# Patient Record
Sex: FEMALE | HISPANIC OR LATINO | Employment: UNEMPLOYED | URBAN - METROPOLITAN AREA
[De-identification: names, ages, dates, MRNs, and addresses within clinical notes are randomized per-mention and may not be internally consistent; named-entity substitution may affect disease eponyms.]

---

## 2018-04-02 ENCOUNTER — ANESTHESIA - HEALTHEAST (OUTPATIENT)
Dept: OBGYN | Facility: CLINIC | Age: 33
End: 2018-04-02

## 2018-04-04 ENCOUNTER — HOME CARE/HOSPICE - HEALTHEAST (OUTPATIENT)
Dept: HOME HEALTH SERVICES | Facility: HOME HEALTH | Age: 33
End: 2018-04-04

## 2018-04-05 ENCOUNTER — HOME CARE/HOSPICE - HEALTHEAST (OUTPATIENT)
Dept: HOME HEALTH SERVICES | Facility: HOME HEALTH | Age: 33
End: 2018-04-05

## 2021-06-16 PROBLEM — Z34.90 PREGNANT: Status: ACTIVE | Noted: 2018-04-02

## 2021-06-17 NOTE — ANESTHESIA PREPROCEDURE EVALUATION
Anesthesia Evaluation      No history of anesthetic complications     Airway   Mallampati: II  Neck ROM: full   Pulmonary - negative ROS and normal exam                          Cardiovascular - negative ROS and normal exam   Neuro/Psych - negative ROS     Endo/Other - negative ROS      GI/Hepatic/Renal - negative ROS           Dental - normal exam                        Anesthesia Plan  Planned anesthetic: epidural    ASA 2     Anesthetic plan and risks discussed with: patient    Post-op plan: routine recovery

## 2021-06-17 NOTE — ANESTHESIA POSTPROCEDURE EVALUATION
Patient: Sarah Carter  * No procedures listed *  Anesthesia type: No value filed.    Patient location: Labor and Delivery  Last vitals:   Vitals:    04/03/18 0353   BP: 97/50   Pulse: 76   Resp: 16   Temp: 37.2  C (98.9  F)   SpO2: 97%     Post vital signs: stable  Level of consciousness: awake and responds to simple questions  Post-anesthesia pain: pain controlled  Post-anesthesia nausea and vomiting: no  Pulmonary: unassisted, return to baseline  Cardiovascular: stable and blood pressure at baseline  Hydration: adequate  Anesthetic events: no    QCDR Measures:  ASA# 11 - Carley-op Cardiac Arrest: ASA11B - Patient did NOT experience unanticipated cardiac arrest  ASA# 12 - Carley-op Mortality Rate: ASA12B - Patient did NOT die  ASA# 13 - PACU Re-Intubation Rate: NA - No ETT / LMA used for case  ASA# 10 - Composite Anes Safety: ASA10A - No serious adverse event    Additional Notes:  Uncomplicated labor epidural

## 2021-06-17 NOTE — ANESTHESIA PROCEDURE NOTES
Epidural Block    Patient location during procedure: OB  Time Called: 4/2/2018 4:18 PM  Reason for Block:labor epidural  Staffing:  Performing  Anesthesiologist: EDGAR TA  Preanesthetic Checklist  Completed: patient identified, risks, benefits, and alternatives discussed, timeout performed, consent obtained, at patient's request, airway assessed, oxygen available, suction available, emergency drugs available and hand hygiene performed  Procedure  Patient position: sitting  Prep: ChloraPrep  Patient monitoring: continuous pulse oximetry, heart rate and blood pressure  Approach: midline  Location: L2-L3  Injection technique: AHSAN saline  Number of Attempts:1  Needle  Needle type: Maria Esther   Needle gauge: 18 G     Catheter in Space: 5

## 2022-11-13 ENCOUNTER — APPOINTMENT (OUTPATIENT)
Dept: ULTRASOUND IMAGING | Facility: CLINIC | Age: 37
End: 2022-11-13
Attending: STUDENT IN AN ORGANIZED HEALTH CARE EDUCATION/TRAINING PROGRAM
Payer: COMMERCIAL

## 2022-11-13 ENCOUNTER — APPOINTMENT (OUTPATIENT)
Dept: MRI IMAGING | Facility: CLINIC | Age: 37
End: 2022-11-13
Payer: COMMERCIAL

## 2022-11-13 ENCOUNTER — HOSPITAL ENCOUNTER (EMERGENCY)
Facility: CLINIC | Age: 37
Discharge: HOME | End: 2022-11-13
Attending: STUDENT IN AN ORGANIZED HEALTH CARE EDUCATION/TRAINING PROGRAM | Admitting: STUDENT IN AN ORGANIZED HEALTH CARE EDUCATION/TRAINING PROGRAM
Payer: COMMERCIAL

## 2022-11-13 VITALS
RESPIRATION RATE: 16 BRPM | DIASTOLIC BLOOD PRESSURE: 62 MMHG | TEMPERATURE: 97.7 F | OXYGEN SATURATION: 97 % | HEART RATE: 66 BPM | SYSTOLIC BLOOD PRESSURE: 103 MMHG

## 2022-11-13 DIAGNOSIS — R51.9 ACUTE NONINTRACTABLE HEADACHE, UNSPECIFIED HEADACHE TYPE: ICD-10-CM

## 2022-11-13 DIAGNOSIS — R42 VERTIGO: ICD-10-CM

## 2022-11-13 DIAGNOSIS — R11.2 NAUSEA AND VOMITING, UNSPECIFIED VOMITING TYPE: ICD-10-CM

## 2022-11-13 LAB
ALBUMIN SERPL-MCNC: 4.1 G/DL (ref 3.5–5)
ALP SERPL-CCNC: 38 U/L (ref 45–120)
ALT SERPL W P-5'-P-CCNC: 10 U/L (ref 0–45)
ANION GAP SERPL CALCULATED.3IONS-SCNC: 11 MMOL/L (ref 5–18)
AST SERPL W P-5'-P-CCNC: 14 U/L (ref 0–40)
BASOPHILS # BLD AUTO: 0 10E3/UL (ref 0–0.2)
BASOPHILS NFR BLD AUTO: 0 %
BILIRUB SERPL-MCNC: 0.5 MG/DL (ref 0–1)
BUN SERPL-MCNC: 16 MG/DL (ref 8–22)
CALCIUM SERPL-MCNC: 8.9 MG/DL (ref 8.5–10.5)
CHLORIDE BLD-SCNC: 104 MMOL/L (ref 98–107)
CO2 SERPL-SCNC: 24 MMOL/L (ref 22–31)
CREAT SERPL-MCNC: 0.79 MG/DL (ref 0.6–1.1)
EOSINOPHIL # BLD AUTO: 0 10E3/UL (ref 0–0.7)
EOSINOPHIL NFR BLD AUTO: 0 %
ERYTHROCYTE [DISTWIDTH] IN BLOOD BY AUTOMATED COUNT: 13.7 % (ref 10–15)
FLUAV RNA SPEC QL NAA+PROBE: NEGATIVE
FLUBV RNA RESP QL NAA+PROBE: NEGATIVE
GFR SERPL CREATININE-BSD FRML MDRD: >90 ML/MIN/1.73M2
GLUCOSE BLD-MCNC: 101 MG/DL (ref 70–125)
HCT VFR BLD AUTO: 37.3 % (ref 35–47)
HGB BLD-MCNC: 12.2 G/DL (ref 11.7–15.7)
IMM GRANULOCYTES # BLD: 0 10E3/UL
IMM GRANULOCYTES NFR BLD: 0 %
LIPASE SERPL-CCNC: 21 U/L (ref 0–52)
LYMPHOCYTES # BLD AUTO: 1.2 10E3/UL (ref 0.8–5.3)
LYMPHOCYTES NFR BLD AUTO: 14 %
MCH RBC QN AUTO: 29.1 PG (ref 26.5–33)
MCHC RBC AUTO-ENTMCNC: 32.7 G/DL (ref 31.5–36.5)
MCV RBC AUTO: 89 FL (ref 78–100)
MONOCYTES # BLD AUTO: 0.5 10E3/UL (ref 0–1.3)
MONOCYTES NFR BLD AUTO: 6 %
NEUTROPHILS # BLD AUTO: 7.1 10E3/UL (ref 1.6–8.3)
NEUTROPHILS NFR BLD AUTO: 80 %
NRBC # BLD AUTO: 0 10E3/UL
NRBC BLD AUTO-RTO: 0 /100
PLATELET # BLD AUTO: 195 10E3/UL (ref 150–450)
POTASSIUM BLD-SCNC: 3.6 MMOL/L (ref 3.5–5)
PROT SERPL-MCNC: 7 G/DL (ref 6–8)
RBC # BLD AUTO: 4.19 10E6/UL (ref 3.8–5.2)
RSV RNA SPEC NAA+PROBE: NEGATIVE
SARS-COV-2 RNA RESP QL NAA+PROBE: NEGATIVE
SODIUM SERPL-SCNC: 139 MMOL/L (ref 136–145)
WBC # BLD AUTO: 8.8 10E3/UL (ref 4–11)

## 2022-11-13 PROCEDURE — 36415 COLL VENOUS BLD VENIPUNCTURE: CPT | Performed by: STUDENT IN AN ORGANIZED HEALTH CARE EDUCATION/TRAINING PROGRAM

## 2022-11-13 PROCEDURE — 96374 THER/PROPH/DIAG INJ IV PUSH: CPT

## 2022-11-13 PROCEDURE — 99285 EMERGENCY DEPT VISIT HI MDM: CPT | Mod: CS,25

## 2022-11-13 PROCEDURE — 250N000011 HC RX IP 250 OP 636

## 2022-11-13 PROCEDURE — 80053 COMPREHEN METABOLIC PANEL: CPT | Performed by: STUDENT IN AN ORGANIZED HEALTH CARE EDUCATION/TRAINING PROGRAM

## 2022-11-13 PROCEDURE — 76705 ECHO EXAM OF ABDOMEN: CPT

## 2022-11-13 PROCEDURE — 250N000013 HC RX MED GY IP 250 OP 250 PS 637: Performed by: STUDENT IN AN ORGANIZED HEALTH CARE EDUCATION/TRAINING PROGRAM

## 2022-11-13 PROCEDURE — 70551 MRI BRAIN STEM W/O DYE: CPT

## 2022-11-13 PROCEDURE — C9803 HOPD COVID-19 SPEC COLLECT: HCPCS

## 2022-11-13 PROCEDURE — 258N000003 HC RX IP 258 OP 636

## 2022-11-13 PROCEDURE — 96361 HYDRATE IV INFUSION ADD-ON: CPT

## 2022-11-13 PROCEDURE — 96375 TX/PRO/DX INJ NEW DRUG ADDON: CPT

## 2022-11-13 PROCEDURE — 85004 AUTOMATED DIFF WBC COUNT: CPT | Performed by: STUDENT IN AN ORGANIZED HEALTH CARE EDUCATION/TRAINING PROGRAM

## 2022-11-13 PROCEDURE — 83690 ASSAY OF LIPASE: CPT | Performed by: STUDENT IN AN ORGANIZED HEALTH CARE EDUCATION/TRAINING PROGRAM

## 2022-11-13 PROCEDURE — 87637 SARSCOV2&INF A&B&RSV AMP PRB: CPT

## 2022-11-13 RX ORDER — DIPHENHYDRAMINE HYDROCHLORIDE 50 MG/ML
50 INJECTION INTRAMUSCULAR; INTRAVENOUS ONCE
Status: COMPLETED | OUTPATIENT
Start: 2022-11-13 | End: 2022-11-13

## 2022-11-13 RX ORDER — MECLIZINE HYDROCHLORIDE 25 MG/1
25 TABLET ORAL 3 TIMES DAILY PRN
Qty: 15 TABLET | Refills: 0 | Status: SHIPPED | OUTPATIENT
Start: 2022-11-13

## 2022-11-13 RX ORDER — KETOROLAC TROMETHAMINE 15 MG/ML
15 INJECTION, SOLUTION INTRAMUSCULAR; INTRAVENOUS ONCE
Status: COMPLETED | OUTPATIENT
Start: 2022-11-13 | End: 2022-11-13

## 2022-11-13 RX ORDER — LORAZEPAM 1 MG/1
1 TABLET ORAL ONCE
Status: COMPLETED | OUTPATIENT
Start: 2022-11-13 | End: 2022-11-13

## 2022-11-13 RX ORDER — METOCLOPRAMIDE HYDROCHLORIDE 5 MG/ML
10 INJECTION INTRAMUSCULAR; INTRAVENOUS ONCE
Status: COMPLETED | OUTPATIENT
Start: 2022-11-13 | End: 2022-11-13

## 2022-11-13 RX ADMIN — SODIUM CHLORIDE 1000 ML: 9 INJECTION, SOLUTION INTRAVENOUS at 17:00

## 2022-11-13 RX ADMIN — DIPHENHYDRAMINE HYDROCHLORIDE 50 MG: 50 INJECTION INTRAMUSCULAR; INTRAVENOUS at 17:01

## 2022-11-13 RX ADMIN — METOCLOPRAMIDE HYDROCHLORIDE 10 MG: 5 INJECTION INTRAMUSCULAR; INTRAVENOUS at 17:01

## 2022-11-13 RX ADMIN — LORAZEPAM 1 MG: 1 TABLET ORAL at 17:23

## 2022-11-13 RX ADMIN — KETOROLAC TROMETHAMINE 15 MG: 15 INJECTION, SOLUTION INTRAMUSCULAR; INTRAVENOUS at 17:00

## 2022-11-13 ASSESSMENT — ENCOUNTER SYMPTOMS
DIZZINESS: 1
NECK STIFFNESS: 0
EYE PAIN: 0
DIARRHEA: 0
LIGHT-HEADEDNESS: 0
SORE THROAT: 0
VOMITING: 1
NECK PAIN: 0
WEAKNESS: 0
SHORTNESS OF BREATH: 0
ABDOMINAL PAIN: 1
PHOTOPHOBIA: 0
CHILLS: 1
NUMBNESS: 0
HEADACHES: 1
FEVER: 1
RHINORRHEA: 0
DYSURIA: 0
HEMATURIA: 0
BLOOD IN STOOL: 0
NAUSEA: 1
BACK PAIN: 0
SPEECH DIFFICULTY: 0

## 2022-11-13 ASSESSMENT — ACTIVITIES OF DAILY LIVING (ADL)
ADLS_ACUITY_SCORE: 33
ADLS_ACUITY_SCORE: 35

## 2022-11-13 NOTE — ED TRIAGE NOTES
Pt arrives to ED with c/o upper abdominal pain, nausea, vomiting, and headache that started about an hour prior to arrival. No diarrhea.      Triage Assessment     Row Name 11/13/22 9284       Triage Assessment (Adult)    Airway WDL WDL       Respiratory WDL    Respiratory WDL WDL       Skin Circulation/Temperature WDL    Skin Circulation/Temperature WDL WDL       Cardiac WDL    Cardiac WDL WDL       Peripheral/Neurovascular WDL    Peripheral Neurovascular WDL WDL       Cognitive/Neuro/Behavioral WDL    Cognitive/Neuro/Behavioral WDL WDL

## 2022-11-13 NOTE — ED PROVIDER NOTES
EMERGENCY DEPARTMENT ENCOUNTER      NAME: Sarah Carter  AGE: 36 year old female  YOB: 1985  MRN: [id number]  EVALUATION DATE & TIME: 2022  4:45 PM    PCP: No Ref-Primary, Physician    ED PROVIDER: Mary Ramon PA-C      Chief Complaint   Patient presents with     Abdominal Pain     Headache         FINAL IMPRESSION:  1. Vertigo    2. Acute nonintractable headache, unspecified headache type    3. Nausea and vomiting, unspecified vomiting type          ED COURSE & MEDICAL DECISION MAKIN:03 PM Met with patient for initial interview. Plan for care discussed.  4:25 PM Staffed patient with Dr. Guerin.  7:39 PM Reevaluated and updated patient. Patient feeling improved. I discussed the plan for discharge with the patient, and patient is agreeable. We discussed supportive cares at home and reasons for return to the ER including new or worsening symptoms. All questions and concerns addressed. Patient to be discharged by RN.    Pertinent Labs & Imaging studies reviewed. (See chart for details)  36 year old otherwise healthy female presents to the Emergency Department for evaluation of continuous vertigo, nausea, vomiting, and headache. Upon exam, patient is afebrile, hemodynamically stable, but appears uncomfortable. Benign neuro exam; however given continuous nature of vertigo concern for central cause. Differential diagnosis includes but not limited to BPPV, CVA, acoustic neuroma, gastritis, pancreatitis, biliary disease, viral syndrome, electrolyte abnormality, anemia. Based on patient's presenting symptoms, laboratories and imaging were ordered.     Vitals and work-up reassuring. CBC and CMP unremarkable. Lipase negative. Abdominal US negative. Negative influenza, COVID, RSV. MRI negative. Patient was treated with Migraine Cocktail and Ativan with improvement in symptoms. Symptoms and workup more consistent with vertigo. Patient was made aware of the above findings. Plan to discharge  patient home with symptomatic care including prescription for meclizine. The patient was stable and well appearing throughout entire ER visit and upon discharge. The patient was advised to follow up with her PCP if symptoms persist and return to the ER if any new or worsening symptoms develop. The patient verbalizes understanding and agrees with the plan. At the conclusion of the encounter I discussed the results of all of the tests and the disposition. The questions were answered. The patient or family acknowledged understanding and was agreeable with the care plan.     ED Course as of 11/13/22 2323   [place] Nov 13, 2022 1943 Patient is a healthy 36-year-old here with sudden onset dizziness that she describes as vertigo, difficulty walking, upper abdominal discomfort, nausea and vomiting.  On arrival she looks uncomfortable.  Vitals otherwise stable.  Neurologically grossly intact.  TMs clear.  Abdomen is generally tender in the upper quadrants.  Labs drawn as well as ultrasound which all are reassuring.  She was given migraine cocktail here with significant improvement.  MRI brain was done because of her new onset vertigo difficulty walking which is unremarkable.  Repeat evaluation showing her resting comfortably with no focal deficits and stating that she feels a lot better.  Plan for discharge home with meclizine as needed and outpatient follow-up.         MEDICATIONS GIVEN IN THE EMERGENCY:  Medications   ketorolac (TORADOL) injection 15 mg (15 mg Intravenous Given 11/13/22 1700)   0.9% sodium chloride BOLUS (0 mLs Intravenous Stopped 11/13/22 2020)   metoclopramide (REGLAN) injection 10 mg (10 mg Intravenous Given 11/13/22 1701)   diphenhydrAMINE (BENADRYL) injection 50 mg (50 mg Intravenous Given 11/13/22 1701)   LORazepam (ATIVAN) tablet 1 mg (1 mg Oral Given 11/13/22 1723)       NEW PRESCRIPTIONS STARTED AT TODAY'S ER VISIT  Discharge Medication List as of 11/13/2022  8:20 PM      START taking these  medications    Details   meclizine (ANTIVERT) 25 MG tablet Take 1 tablet (25 mg) by mouth 3 times daily as needed for dizziness, Disp-15 tablet, R-0, Local Print                =================================================================    HPI    Patient information was obtained from: patient and patient's partner    Use of : Yes (Phone) - Language Chinese    Sarah Carter is a 36 year old otherwise healthy female who presents to this ED for evaluation of headache, vertigo, nausea, vomiting, and abdominal pain. 4 hours prior to arrival, patient developed abrupt onset vertigo with associated nausea, vomiting, and upper abdominal pain. She reports an associated bilateral frontal headache and full body tingling. She denies associated hearing loss, tinnitus, recent URI-like symptoms, changes in vision, jaw pain, neck pain or stiffness, speech difficulty, upper or lower extremity weakness, chest pain, shortness of breath, diarrhea, blood in stools, or urinary symptoms. No known exposure to COVID or influenza. No recent travel. No contacts are similarly ill.     REVIEW OF SYSTEMS   Review of Systems   Constitutional: Positive for chills and fever.   HENT: Negative for congestion, ear pain, hearing loss, rhinorrhea, sore throat and tinnitus.    Eyes: Negative for photophobia, pain and visual disturbance.   Respiratory: Negative for shortness of breath.    Cardiovascular: Negative for chest pain and leg swelling.   Gastrointestinal: Positive for abdominal pain, nausea and vomiting. Negative for blood in stool and diarrhea.   Genitourinary: Negative for dysuria, hematuria, vaginal bleeding and vaginal discharge.   Musculoskeletal: Negative for back pain, neck pain and neck stiffness.   Skin: Negative for rash.   Neurological: Positive for dizziness (continuous) and headaches. Negative for syncope, speech difficulty, weakness, light-headedness and numbness.   Psychiatric/Behavioral: Negative for  suicidal ideas.       PAST MEDICAL HISTORY:  No past medical history on file.    PAST SURGICAL HISTORY:  No past surgical history on file.        CURRENT MEDICATIONS:    meclizine (ANTIVERT) 25 MG tablet  calcium carbonate-vitamin D2 500 mg(1,250mg) -200 unit tablet  naproxen (NAPROSYN) 500 MG tablet  prenatal no115-iron-folic acid 29 mg iron- 1 mg Chew        ALLERGIES:  Allergies   Allergen Reactions     Penicillins Unknown     Other Drug Allergy (See Comments) Itching     [place]-pt reports med causes red/itchy eyes       FAMILY HISTORY:  No family history on file.    SOCIAL HISTORY:   Social History     Socioeconomic History     Marital status:    Tobacco Use     Smoking status: Never     Smokeless tobacco: Never   Substance and Sexual Activity     Alcohol use: No     Drug use: No     Sexual activity: Yes       VITALS:  /62   Pulse 66   Temp 97.7  F (36.5  C) (Temporal)   Resp 16   LMP 11/13/2022 (Exact Date)   SpO2 97%     PHYSICAL EXAM    Constitutional:  Alert, appears uncomfortable. Cooperative.  EYES: PERRL. EOM intact. Conjunctivae clear.   HENT:  Atraumatic, normocephalic. External and internal ears normal with normal TM without erythema or perforation. Normal nose. Oropharynx without erythema or swelling. Moist membranes.  Respiratory:  Respirations even, unlabored, in no acute respiratory distress. Lungs clear to auscultation bilaterally without wheeze, rhonchi, or rales. No cough.  Cardiovascular:  Regular rate and rhythm, good peripheral perfusion.  GI: Soft, flat, nondistended, very mild epigastric tenderness to palpation. Bowel sounds normal.  Musculoskeletal:  No edema. No cyanosis. Range of motion major extremities intact.    Integument: Warm, Dry, No erythema, No rash.   Neurologic:  Alert & oriented. No focal deficits noted. Motor intact. Sensation intact. Coordination intact. 5/5 strength. GCS 15.   Psych: Normal mood and affect.      LAB:  All pertinent labs reviewed and  interpreted.  Results for orders placed or performed during the hospital encounter of 11/13/22   Abdomen US, limited (RUQ only)    Impression    IMPRESSION:  1.  Normal limited abdominal ultrasound.       MR Brain w/o Contrast    Impression    IMPRESSION:  No acute intracranial finding.   Comprehensive metabolic panel   Result Value Ref Range    Sodium 139 136 - 145 mmol/L    Potassium 3.6 3.5 - 5.0 mmol/L    Chloride 104 98 - 107 mmol/L    Carbon Dioxide (CO2) 24 22 - 31 mmol/L    Anion Gap 11 5 - 18 mmol/L    Urea Nitrogen 16 8 - 22 mg/dL    Creatinine 0.79 0.60 - 1.10 mg/dL    Calcium 8.9 8.5 - 10.5 mg/dL    Glucose 101 70 - 125 mg/dL    Alkaline Phosphatase 38 (L) 45 - 120 U/L    AST 14 0 - 40 U/L    ALT 10 0 - 45 U/L    Protein Total 7.0 6.0 - 8.0 g/dL    Albumin 4.1 3.5 - 5.0 g/dL    Bilirubin Total 0.5 0.0 - 1.0 mg/dL    GFR Estimate >90 >60 mL/min/1.73m2   Result Value Ref Range    Lipase 21 0 - 52 U/L   CBC with platelets and differential   Result Value Ref Range    WBC Count 8.8 4.0 - 11.0 10e3/uL    RBC Count 4.19 3.80 - 5.20 10e6/uL    Hemoglobin 12.2 11.7 - 15.7 g/dL    Hematocrit 37.3 35.0 - 47.0 %    MCV 89 78 - 100 fL    MCH 29.1 26.5 - 33.0 pg    MCHC 32.7 31.5 - 36.5 g/dL    RDW 13.7 10.0 - 15.0 %    Platelet Count 195 150 - 450 10e3/uL    % Neutrophils 80 %    % Lymphocytes 14 %    % Monocytes 6 %    % Eosinophils 0 %    % Basophils 0 %    % Immature Granulocytes 0 %    NRBCs per 100 WBC 0 <1 /100    Absolute Neutrophils 7.1 1.6 - 8.3 10e3/uL    Absolute Lymphocytes 1.2 0.8 - 5.3 10e3/uL    Absolute Monocytes 0.5 0.0 - 1.3 10e3/uL    Absolute Eosinophils 0.0 0.0 - 0.7 10e3/uL    Absolute Basophils 0.0 0.0 - 0.2 10e3/uL    Absolute Immature Granulocytes 0.0 <=0.4 10e3/uL    Absolute NRBCs 0.0 10e3/uL   Symptomatic; Yes; 11/13/2022 Influenza A/B & SARS-CoV2 (COVID-19) Virus PCR Multiplex Nasopharyngeal    Specimen: Nasopharyngeal; Swab   Result Value Ref Range    Influenza A PCR Negative Negative     Influenza B PCR Negative Negative    RSV PCR Negative Negative    SARS CoV2 PCR Negative Negative       RADIOLOGY:  Reviewed all pertinent imaging. Please see official radiology report.  MR Brain w/o Contrast   Final Result   IMPRESSION:   No acute intracranial finding.      Abdomen US, limited (RUQ only)   Final Result   IMPRESSION:   1.  Normal limited abdominal ultrasound.                Mary Ramon PA-C  [facility] EMERGENCY ROOM  [facility] [facility] [address] [phone]     Mary Ramon PA-C  11/13/22 6623

## 2022-11-13 NOTE — ED PROVIDER NOTES
"Emergency Department Midlevel Supervisory Note     I personally saw the patient and performed a substantive portion of the visit including all aspects of the medical decision making.    ED Course:  4:09 PM Mary Ramon PA-C staffed patient with me. I agree with their assessment and plan of management, and I will see the patient.  4:09 PM I met with the patient to introduce myself, gather additional history, perform my initial exam, and discuss the plan.   ED Course as of 11/13/22 1948   Sun Nov 13, 2022 1943 Patient is a healthy 36-year-old here with sudden onset dizziness that she describes as vertigo, difficulty walking, upper abdominal discomfort, nausea and vomiting.  On arrival she looks uncomfortable.  Vitals otherwise stable.  Neurologically grossly intact.  TMs clear.  Abdomen is generally tender in the upper quadrants.  Labs drawn as well as ultrasound which all are reassuring.  She was given migraine cocktail here with significant improvement.  MRI brain was done because of her new onset vertigo difficulty walking which is unremarkable.  Repeat evaluation showing her resting comfortably with no focal deficits and stating that she feels a lot better.  Plan for discharge home with meclizine as needed and outpatient follow-up.         Brief HPI:     Sarah Carter is a 36 year old female who presents for evaluation of vertigo. She has had constant episodes of \"room spinning\" vertigo along with upper and lower extremities tingling. She notes being constantly nauseous and had one episode of emesis. Patient endorses frontal headache. She reports upper abdominal pain with palpation, possibly resulting from her vertigo.    I, Gina Horner, am serving as a scribe to document services personally performed by Ben Guerin DO, based on my observations and the provider's statements to me.   I, Ben Guerin DO attest that Gina Horner was acting in a scribe capacity, has observed my performance of the services " and has documented them in accordance with my direction.    Brief Physical Exam: /57   Pulse 77   Temp 97.7  F (36.5  C) (Temporal)   Resp 16   LMP 11/13/2022 (Exact Date)   SpO2 99%   Constitutional:  Alert, in no acute distress  EYES: Conjunctivae clear  HENT:  Atraumatic, normocephalic  Respiratory:  Respirations even, unlabored, in no acute respiratory distress  Cardiovascular:  Regular rate and rhythm, good peripheral perfusion  GI: Soft, nondistended, nontender, no palpable masses, no rebound, no guarding   Musculoskeletal:  No edema. No cyanosis. Range of motion major extremities intact.    Integument: Warm, Dry, No erythema, No rash.   Neurologic:  Alert & oriented, no focal deficits noted  Psych: Normal mood and affect     MDM:    ED Course as of 11/13/22 1948   Sun Nov 13, 2022 1943 Patient is a healthy 36-year-old here with sudden onset dizziness that she describes as vertigo, difficulty walking, upper abdominal discomfort, nausea and vomiting.  On arrival she looks uncomfortable.  Vitals otherwise stable.  Neurologically grossly intact.  TMs clear.  Abdomen is generally tender in the upper quadrants.  Labs drawn as well as ultrasound which all are reassuring.  She was given migraine cocktail here with significant improvement.  MRI brain was done because of her new onset vertigo difficulty walking which is unremarkable.  Repeat evaluation showing her resting comfortably with no focal deficits and stating that she feels a lot better.  Plan for discharge home with meclizine as needed and outpatient follow-up.     -Patient's imaging negative.  She looks well clinically.  Plan for discharge home    1. Vertigo    2. Acute nonintractable headache, unspecified headache type    3. Nausea and vomiting, unspecified vomiting type        Labs and Imaging:  Results for orders placed or performed during the hospital encounter of 11/13/22   Abdomen US, limited (RUQ only)    Impression    IMPRESSION:  1.   Normal limited abdominal ultrasound.       MR Brain w/o Contrast    Impression    IMPRESSION:  No acute intracranial finding.   Comprehensive metabolic panel   Result Value Ref Range    Sodium 139 136 - 145 mmol/L    Potassium 3.6 3.5 - 5.0 mmol/L    Chloride 104 98 - 107 mmol/L    Carbon Dioxide (CO2) 24 22 - 31 mmol/L    Anion Gap 11 5 - 18 mmol/L    Urea Nitrogen 16 8 - 22 mg/dL    Creatinine 0.79 0.60 - 1.10 mg/dL    Calcium 8.9 8.5 - 10.5 mg/dL    Glucose 101 70 - 125 mg/dL    Alkaline Phosphatase 38 (L) 45 - 120 U/L    AST 14 0 - 40 U/L    ALT 10 0 - 45 U/L    Protein Total 7.0 6.0 - 8.0 g/dL    Albumin 4.1 3.5 - 5.0 g/dL    Bilirubin Total 0.5 0.0 - 1.0 mg/dL    GFR Estimate >90 >60 mL/min/1.73m2   Result Value Ref Range    Lipase 21 0 - 52 U/L   CBC with platelets and differential   Result Value Ref Range    WBC Count 8.8 4.0 - 11.0 10e3/uL    RBC Count 4.19 3.80 - 5.20 10e6/uL    Hemoglobin 12.2 11.7 - 15.7 g/dL    Hematocrit 37.3 35.0 - 47.0 %    MCV 89 78 - 100 fL    MCH 29.1 26.5 - 33.0 pg    MCHC 32.7 31.5 - 36.5 g/dL    RDW 13.7 10.0 - 15.0 %    Platelet Count 195 150 - 450 10e3/uL    % Neutrophils 80 %    % Lymphocytes 14 %    % Monocytes 6 %    % Eosinophils 0 %    % Basophils 0 %    % Immature Granulocytes 0 %    NRBCs per 100 WBC 0 <1 /100    Absolute Neutrophils 7.1 1.6 - 8.3 10e3/uL    Absolute Lymphocytes 1.2 0.8 - 5.3 10e3/uL    Absolute Monocytes 0.5 0.0 - 1.3 10e3/uL    Absolute Eosinophils 0.0 0.0 - 0.7 10e3/uL    Absolute Basophils 0.0 0.0 - 0.2 10e3/uL    Absolute Immature Granulocytes 0.0 <=0.4 10e3/uL    Absolute NRBCs 0.0 10e3/uL   Symptomatic; Yes; 11/13/2022 Influenza A/B & SARS-CoV2 (COVID-19) Virus PCR Multiplex Nasopharyngeal    Specimen: Nasopharyngeal; Swab   Result Value Ref Range    Influenza A PCR Negative Negative    Influenza B PCR Negative Negative    RSV PCR Negative Negative    SARS CoV2 PCR Negative Negative     I have reviewed the relevant laboratory and radiology  studies    Procedures:  I was present for the key portions of this procedure:   Ben Guerin DO  [facility] EMERGENCY ROOM  [address] [facility] [address] [phone]     Ben Guerin DO  11/13/22 1958

## 2022-11-14 NOTE — DISCHARGE INSTRUCTIONS
You were seen in the ER for symptoms of vertigo.  Your work-up was negative including labs and MRI.. Your symptoms are likely secondary to benign paroxysmal positional vertigo (BPPV) as discussed.    You were prescribed meclizine to be taken as needed for symptoms of vertigo or nausea. Please stay well hydrated.     Please establish care with a primary care provider to discuss long-term management of vertigo if symptoms persist.    Please return to the ER if any new or worsening symptoms develop including severe headache, changes in vision, speech difficulty, chest pain, shortness of breath, abdominal pain, vomiting, weakness/numbness/tingling in the arms or legs, fever/chills.

## 2025-01-23 ENCOUNTER — APPOINTMENT (OUTPATIENT)
Dept: CT IMAGING | Facility: CLINIC | Age: 40
End: 2025-01-23
Attending: STUDENT IN AN ORGANIZED HEALTH CARE EDUCATION/TRAINING PROGRAM
Payer: COMMERCIAL

## 2025-01-23 ENCOUNTER — HOSPITAL ENCOUNTER (EMERGENCY)
Facility: CLINIC | Age: 40
Discharge: HOME | End: 2025-01-23
Attending: STUDENT IN AN ORGANIZED HEALTH CARE EDUCATION/TRAINING PROGRAM
Payer: COMMERCIAL

## 2025-01-23 VITALS
HEART RATE: 73 BPM | BODY MASS INDEX: 23.07 KG/M2 | HEIGHT: 67 IN | WEIGHT: 147 LBS | RESPIRATION RATE: 19 BRPM | TEMPERATURE: 98.3 F | DIASTOLIC BLOOD PRESSURE: 58 MMHG | OXYGEN SATURATION: 99 % | SYSTOLIC BLOOD PRESSURE: 123 MMHG

## 2025-01-23 DIAGNOSIS — R10.31 RIGHT LOWER QUADRANT ABDOMINAL PAIN: ICD-10-CM

## 2025-01-23 LAB
ALBUMIN SERPL BCG-MCNC: 4.5 G/DL (ref 3.5–5.2)
ALBUMIN UR-MCNC: NEGATIVE MG/DL
ALP SERPL-CCNC: 43 U/L (ref 40–150)
ALT SERPL W P-5'-P-CCNC: 13 U/L (ref 0–50)
ANION GAP SERPL CALCULATED.3IONS-SCNC: 12 MMOL/L (ref 7–15)
APPEARANCE UR: CLEAR
AST SERPL W P-5'-P-CCNC: 22 U/L (ref 0–45)
BASOPHILS # BLD AUTO: 0 10E3/UL (ref 0–0.2)
BASOPHILS NFR BLD AUTO: 0 %
BILIRUB DIRECT SERPL-MCNC: <0.2 MG/DL (ref 0–0.3)
BILIRUB SERPL-MCNC: 0.2 MG/DL
BILIRUB UR QL STRIP: NEGATIVE
BUN SERPL-MCNC: 22.6 MG/DL (ref 6–20)
CALCIUM SERPL-MCNC: 9.8 MG/DL (ref 8.8–10.4)
CHLORIDE SERPL-SCNC: 102 MMOL/L (ref 98–107)
COLOR UR AUTO: ABNORMAL
CREAT SERPL-MCNC: 0.99 MG/DL (ref 0.51–0.95)
EGFRCR SERPLBLD CKD-EPI 2021: 75 ML/MIN/1.73M2
EOSINOPHIL # BLD AUTO: 0.1 10E3/UL (ref 0–0.7)
EOSINOPHIL NFR BLD AUTO: 1 %
ERYTHROCYTE [DISTWIDTH] IN BLOOD BY AUTOMATED COUNT: 13.1 % (ref 10–15)
GLUCOSE SERPL-MCNC: 84 MG/DL (ref 70–99)
GLUCOSE UR STRIP-MCNC: NEGATIVE MG/DL
HCG SERPL QL: NEGATIVE
HCG UR QL: NEGATIVE
HCO3 SERPL-SCNC: 26 MMOL/L (ref 22–29)
HCT VFR BLD AUTO: 39.4 % (ref 35–47)
HGB BLD-MCNC: 13.4 G/DL (ref 11.7–15.7)
HGB UR QL STRIP: ABNORMAL
HOLD SPECIMEN: NORMAL
IMM GRANULOCYTES # BLD: 0 10E3/UL
IMM GRANULOCYTES NFR BLD: 0 %
KETONES UR STRIP-MCNC: NEGATIVE MG/DL
LEUKOCYTE ESTERASE UR QL STRIP: NEGATIVE
LYMPHOCYTES # BLD AUTO: 1.8 10E3/UL (ref 0.8–5.3)
LYMPHOCYTES NFR BLD AUTO: 25 %
MCH RBC QN AUTO: 30.3 PG (ref 26.5–33)
MCHC RBC AUTO-ENTMCNC: 34 G/DL (ref 31.5–36.5)
MCV RBC AUTO: 89 FL (ref 78–100)
MONOCYTES # BLD AUTO: 0.6 10E3/UL (ref 0–1.3)
MONOCYTES NFR BLD AUTO: 9 %
MUCOUS THREADS #/AREA URNS LPF: PRESENT /LPF
NEUTROPHILS # BLD AUTO: 4.5 10E3/UL (ref 1.6–8.3)
NEUTROPHILS NFR BLD AUTO: 65 %
NITRATE UR QL: NEGATIVE
NRBC # BLD AUTO: 0 10E3/UL
NRBC BLD AUTO-RTO: 0 /100
PH UR STRIP: 6.5 [PH] (ref 5–7)
PLATELET # BLD AUTO: 238 10E3/UL (ref 150–450)
POTASSIUM SERPL-SCNC: 3.9 MMOL/L (ref 3.4–5.3)
PROT SERPL-MCNC: 7.4 G/DL (ref 6.4–8.3)
RBC # BLD AUTO: 4.42 10E6/UL (ref 3.8–5.2)
RBC URINE: <1 /HPF
SODIUM SERPL-SCNC: 140 MMOL/L (ref 135–145)
SP GR UR STRIP: 1.03 (ref 1–1.03)
SQUAMOUS EPITHELIAL: 1 /HPF
UROBILINOGEN UR STRIP-MCNC: <2 MG/DL
WBC # BLD AUTO: 7 10E3/UL (ref 4–11)
WBC URINE: <1 /HPF

## 2025-01-23 PROCEDURE — 99285 EMERGENCY DEPT VISIT HI MDM: CPT | Mod: 25

## 2025-01-23 PROCEDURE — 82565 ASSAY OF CREATININE: CPT | Performed by: STUDENT IN AN ORGANIZED HEALTH CARE EDUCATION/TRAINING PROGRAM

## 2025-01-23 PROCEDURE — 84155 ASSAY OF PROTEIN SERUM: CPT | Performed by: STUDENT IN AN ORGANIZED HEALTH CARE EDUCATION/TRAINING PROGRAM

## 2025-01-23 PROCEDURE — 250N000011 HC RX IP 250 OP 636: Performed by: STUDENT IN AN ORGANIZED HEALTH CARE EDUCATION/TRAINING PROGRAM

## 2025-01-23 PROCEDURE — 84703 CHORIONIC GONADOTROPIN ASSAY: CPT | Performed by: STUDENT IN AN ORGANIZED HEALTH CARE EDUCATION/TRAINING PROGRAM

## 2025-01-23 PROCEDURE — 81025 URINE PREGNANCY TEST: CPT | Performed by: STUDENT IN AN ORGANIZED HEALTH CARE EDUCATION/TRAINING PROGRAM

## 2025-01-23 PROCEDURE — 85004 AUTOMATED DIFF WBC COUNT: CPT | Performed by: STUDENT IN AN ORGANIZED HEALTH CARE EDUCATION/TRAINING PROGRAM

## 2025-01-23 PROCEDURE — 81001 URINALYSIS AUTO W/SCOPE: CPT | Performed by: STUDENT IN AN ORGANIZED HEALTH CARE EDUCATION/TRAINING PROGRAM

## 2025-01-23 PROCEDURE — 85014 HEMATOCRIT: CPT | Performed by: STUDENT IN AN ORGANIZED HEALTH CARE EDUCATION/TRAINING PROGRAM

## 2025-01-23 PROCEDURE — 74177 CT ABD & PELVIS W/CONTRAST: CPT

## 2025-01-23 PROCEDURE — 80048 BASIC METABOLIC PNL TOTAL CA: CPT | Performed by: STUDENT IN AN ORGANIZED HEALTH CARE EDUCATION/TRAINING PROGRAM

## 2025-01-23 PROCEDURE — 96374 THER/PROPH/DIAG INJ IV PUSH: CPT | Mod: 59

## 2025-01-23 PROCEDURE — 82310 ASSAY OF CALCIUM: CPT | Performed by: STUDENT IN AN ORGANIZED HEALTH CARE EDUCATION/TRAINING PROGRAM

## 2025-01-23 PROCEDURE — 82040 ASSAY OF SERUM ALBUMIN: CPT | Performed by: STUDENT IN AN ORGANIZED HEALTH CARE EDUCATION/TRAINING PROGRAM

## 2025-01-23 PROCEDURE — 36415 COLL VENOUS BLD VENIPUNCTURE: CPT | Performed by: STUDENT IN AN ORGANIZED HEALTH CARE EDUCATION/TRAINING PROGRAM

## 2025-01-23 RX ORDER — IOPAMIDOL 755 MG/ML
90 INJECTION, SOLUTION INTRAVASCULAR ONCE
Status: COMPLETED | OUTPATIENT
Start: 2025-01-23 | End: 2025-01-23

## 2025-01-23 RX ORDER — KETOROLAC TROMETHAMINE 15 MG/ML
15 INJECTION, SOLUTION INTRAMUSCULAR; INTRAVENOUS ONCE
Status: COMPLETED | OUTPATIENT
Start: 2025-01-23 | End: 2025-01-23

## 2025-01-23 RX ADMIN — KETOROLAC TROMETHAMINE 15 MG: 15 INJECTION, SOLUTION INTRAMUSCULAR; INTRAVENOUS at 21:47

## 2025-01-23 RX ADMIN — IOPAMIDOL 90 ML: 755 INJECTION, SOLUTION INTRAVENOUS at 22:09

## 2025-01-23 ASSESSMENT — COLUMBIA-SUICIDE SEVERITY RATING SCALE - C-SSRS
2. HAVE YOU ACTUALLY HAD ANY THOUGHTS OF KILLING YOURSELF IN THE PAST MONTH?: NO
1. IN THE PAST MONTH, HAVE YOU WISHED YOU WERE DEAD OR WISHED YOU COULD GO TO SLEEP AND NOT WAKE UP?: NO
6. HAVE YOU EVER DONE ANYTHING, STARTED TO DO ANYTHING, OR PREPARED TO DO ANYTHING TO END YOUR LIFE?: NO

## 2025-01-23 ASSESSMENT — ACTIVITIES OF DAILY LIVING (ADL): ADLS_ACUITY_SCORE: 41

## 2025-01-24 NOTE — ED PROVIDER NOTES
EMERGENCY DEPARTMENT ENCOUNTER      NAME: Sarah Carter  AGE: 39 year old female  YOB: 1985  MRN: [id number]  EVALUATION DATE & TIME: No admission date for patient encounter.    PCP: No Ref-Primary, Physician    ED PROVIDER: Lyle Lopes MD      Chief Complaint   Patient presents with    Back Pain    Abdominal Pain         FINAL IMPRESSION:  1. Right lower quadrant abdominal pain          ED COURSE & MEDICAL DECISION MAKING:    Pertinent Labs & Imaging studies reviewed. (See chart for details)  39 year old female presents to the Emergency Department for evaluation of abdominal and back pain    Patient is a 39-year-old female present to the emergency department for right lower back and abdominal pain present since yesterday.  Pains been essentially constant.  No clear exacerbating or relieving factors.  Has not had any vomiting.  No urinary symptoms.  No abnormal vaginal bleeding or discharge.  Denies any fevers at home.  Denies any prior abdominal surgeries    On exam patient does have some right lower quadrant abdominal tenderness as well as pain in the right flank.  No guarding or rebound.  Warm and well-perfused extremities.  Lungs clear without any wheezes or rales    Initial differential includes was not limited to appendicitis, kidney stone, cholecystitis or perhaps ovarian torsion or ectopic pregnancy.  Will obtain urinalysis and urine pregnancy as well as a CT abdomen and pelvis along with blood work    ED Course as of 01/23/25 2331   Thu Jan 23, 2025   2257 Labs reviewed myself.  CBC does not show any significant leukocytosis or anemia.   2258 Slight elevation in creatinine up to 0.99 from baseline of 0.8, no significant electrolyte derangement normal LFTs, UA does not suggest infection.  Urine pregnancy negative.   2258 CT abdomen pelvis does not show any acute intra-abdominal process.  Trace free fluid in the physiologic range with no visualized ovarian cysts.   2321 Upon repeat  evaluation patient's had significant improvement in her pain.  Abdomen is benign on repeat evaluation.  Unclear etiology of her symptoms.  Could be related to ruptured ovarian cyst as she did have trace amount of free fluid in the pelvis on the CT scan although no clearly visualized cyst.  Ultimately believe she is safe for discharge home does not require further emergent workup or hospitalization.  Patient comfortable this plan.  Signs and symptoms worsen condition were discussed and return precautions given.         Medical Decision Making  Obtained supplemental history:Supplemental history obtained?: No  Reviewed external records: External records reviewed?: No  Care impacted by chronic illness:Chronic Pain  Care significantly affected by social determinants of health:N/A  Did you consider but not order tests?: Work up considered but not performed and documented in chart, if applicable  Did you interpret images independently?: Independent interpretation of ECG and images noted in documentation, when applicable.  Consultation discussion with other provider:Did you involve another provider (consultant, , pharmacy, etc.)?: No  Discharge. No recommendations on prescription strength medication(s). I considered admission, but discharged patient after significant clinical improvement.  Not Applicable          At the conclusion of the encounter I discussed the results of all of the tests and the disposition. The questions were answered. The patient or family acknowledged understanding and was agreeable with the care plan.     0 minutes of critical care time     MEDICATIONS GIVEN IN THE EMERGENCY:  Medications   ketorolac (TORADOL) injection 15 mg (15 mg Intravenous $Given 1/23/25 2147)   iopamidol (ISOVUE-370) solution 90 mL (90 mLs Intravenous $Given 1/23/25 2209)       NEW PRESCRIPTIONS STARTED AT TODAY'S ER VISIT  Discharge Medication List as of 1/23/2025 11:24 PM              =================================================================    hospitals    Patient information was obtained from: patient    Patient is a 39-year-old female present to the emergency department for right lower back and abdominal pain present since yesterday.  Pains been essentially constant.  No clear exacerbating or relieving factors.  Has not had any vomiting.  No urinary symptoms.  No abnormal vaginal bleeding or discharge.  Denies any fevers at home.  Denies any prior abdominal surgeries    REVIEW OF SYSTEMS   Refer to the hospitals    PAST MEDICAL HISTORY:  No past medical history on file.    PAST SURGICAL HISTORY:  No past surgical history on file.        CURRENT MEDICATIONS:    calcium carbonate-vitamin D2 500 mg(1,250mg) -200 unit tablet  meclizine (ANTIVERT) 25 MG tablet  naproxen (NAPROSYN) 500 MG tablet  prenatal no115-iron-folic acid 29 mg iron- 1 mg Chew        ALLERGIES:  Allergies   Allergen Reactions    Penicillins Unknown    Other Drug Allergy (See Comments) Itching     [place]-pt reports med causes red/itchy eyes       FAMILY HISTORY:  No family history on file.    SOCIAL HISTORY:   Social History     Socioeconomic History    Marital status:    Tobacco Use    Smoking status: Never    Smokeless tobacco: Never   Substance and Sexual Activity    Alcohol use: No    Drug use: No    Sexual activity: Yes     Social Drivers of Health     Financial Resource Strain: Low Risk  (10/4/2023)    Received from [facility]    Financial Resource Strain     Difficulty of Paying Living Expenses: 3   Food Insecurity: No Food Insecurity (10/4/2023)    Received from [facility]    Food Insecurity     Worried About Running Out of Food in the Last Year: 1   Transportation Needs: No Transportation Needs (10/4/2023)    Received from [facility]    Transportation Needs     Lack of Transportation (Medical): 1   Social  "Connections: Socially Integrated (10/4/2023)    Received from [facility]    Social Connections     Frequency of Communication with Friends and Family: 0   Housing Stability: Low Risk  (10/4/2023)    Received from [facility]    Housing Stability     Unable to Pay for Housing in the Last Year: 1       VITALS:  /58 (BP Location: Left arm, Patient Position: Chair, Cuff Size: Adult Regular)   Pulse 73   Temp 98.3  F (36.8  C) (Oral)   Resp 19   Ht 1.702 m (5' 7\")   Wt 66.7 kg (147 lb)   LMP 01/11/2025 (Approximate)   SpO2 99%   BMI 23.02 kg/m      PHYSICAL EXAM    Constitutional: Well developed, Well nourished, NAD,  HENT: Normocephalic, Atraumatic,  mucous membranes moist,   Neck- trachea midline, No stridor.    Eyes:EOMI, Conjunctiva normal, No discharge.   Respiratory: Normal breath sounds, No respiratory distress, No wheezing.    Cardiovascular: Normal heart rate, Regular rhythm, No murmurs,   Abdominal: Soft,  nondistended, tenderness in the right lower quadrant and right flank without guarding or rebound, no CVA tenderness  Musculoskeletal: no deformity or malalignment   Integument: Warm, Dry, No erythema,    Neurologic: Alert & oriented x 3   Psychiatric: Affect normal, Cooperative.      LAB:  All pertinent labs reviewed and interpreted.  Results for orders placed or performed during the hospital encounter of 01/23/25   CT Abdomen Pelvis w Contrast    Impression    IMPRESSION:   No acute process in the abdomen or pelvis.   Extra Red Top Tube   Result Value Ref Range    Hold Specimen JIC    Extra Green Top (Lithium Heparin) Tube   Result Value Ref Range    Hold Specimen JIC    Extra Purple Top Tube   Result Value Ref Range    Hold Specimen JIC    Basic metabolic panel   Result Value Ref Range    Sodium 140 135 - 145 mmol/L    Potassium 3.9 3.4 - 5.3 mmol/L    Chloride 102 98 - 107 mmol/L    Carbon Dioxide (CO2) 26 22 - 29 mmol/L    " Anion Gap 12 7 - 15 mmol/L    Urea Nitrogen 22.6 (H) 6.0 - 20.0 mg/dL    Creatinine 0.99 (H) 0.51 - 0.95 mg/dL    GFR Estimate 75 >60 mL/min/1.73m2    Calcium 9.8 8.8 - 10.4 mg/dL    Glucose 84 70 - 99 mg/dL   Hepatic panel   Result Value Ref Range    Protein Total 7.4 6.4 - 8.3 g/dL    Albumin 4.5 3.5 - 5.2 g/dL    Bilirubin Total 0.2 <=1.2 mg/dL    Alkaline Phosphatase 43 40 - 150 U/L    AST 22 0 - 45 U/L    ALT 13 0 - 50 U/L    Bilirubin Direct <0.20 0.00 - 0.30 mg/dL   CBC with platelets and differential   Result Value Ref Range    WBC Count 7.0 4.0 - 11.0 10e3/uL    RBC Count 4.42 3.80 - 5.20 10e6/uL    Hemoglobin 13.4 11.7 - 15.7 g/dL    Hematocrit 39.4 35.0 - 47.0 %    MCV 89 78 - 100 fL    MCH 30.3 26.5 - 33.0 pg    MCHC 34.0 31.5 - 36.5 g/dL    RDW 13.1 10.0 - 15.0 %    Platelet Count 238 150 - 450 10e3/uL    % Neutrophils 65 %    % Lymphocytes 25 %    % Monocytes 9 %    % Eosinophils 1 %    % Basophils 0 %    % Immature Granulocytes 0 %    NRBCs per 100 WBC 0 <1 /100    Absolute Neutrophils 4.5 1.6 - 8.3 10e3/uL    Absolute Lymphocytes 1.8 0.8 - 5.3 10e3/uL    Absolute Monocytes 0.6 0.0 - 1.3 10e3/uL    Absolute Eosinophils 0.1 0.0 - 0.7 10e3/uL    Absolute Basophils 0.0 0.0 - 0.2 10e3/uL    Absolute Immature Granulocytes 0.0 <=0.4 10e3/uL    Absolute NRBCs 0.0 10e3/uL   UA with Microscopic reflex to Culture    Specimen: Urine, Clean Catch   Result Value Ref Range    Color Urine Light Yellow Colorless, Straw, Light Yellow, Yellow    Appearance Urine Clear Clear    Glucose Urine Negative Negative mg/dL    Bilirubin Urine Negative Negative    Ketones Urine Negative Negative mg/dL    Specific Gravity Urine 1.026 1.001 - 1.030    Blood Urine 0.06 mg/dL (A) Negative    pH Urine 6.5 5.0 - 7.0    Protein Albumin Urine Negative Negative mg/dL    Urobilinogen Urine <2.0 <2.0 mg/dL    Nitrite Urine Negative Negative    Leukocyte Esterase Urine Negative Negative    Mucus Urine Present (A) None Seen /LPF    RBC Urine  <1 <=2 /HPF    WBC Urine <1 <=5 /HPF    Squamous Epithelials Urine 1 <=1 /HPF   HCG qualitative urine   Result Value Ref Range    hCG Urine Qualitative Negative Negative   HCG QUALitative pregnancy (blood)   Result Value Ref Range    hCG Serum Qualitative Negative Negative       RADIOLOGY:  Reviewed all pertinent imaging. Please see official radiology report.  CT Abdomen Pelvis w Contrast   Final Result   IMPRESSION:    No acute process in the abdomen or pelvis.          EKG:        PROCEDURES:         [facility]:   CMS Diagnoses:              I, Whitley Price, am serving as a scribe to document services personally performed by Lyle Lopes MD based on my observation and the provider's statements to me. I, Lyle Lopes MD, attest that Whitley Price is acting in a scribe capacity, has observed my performance of the services and has documented them in accordance with my direction.    Lyle Lopes MD  [facility] EMERGENCY ROOM  [facility] [facility] [phone]      Lyle Lopes MD  01/23/25 0280

## 2025-01-24 NOTE — DISCHARGE INSTRUCTIONS
Fortunately your workup in the emergency department today was reassuring.  Your CT scan did not show any dangerous process causing the pain in her abdomen your blood work and other tests were reassuring as well.  I would recommend continuing to take Tylenol and ibuprofen as needed for your pain.  If your pain becomes significantly worse, you have recurrent vomiting, develop fevers or other concerning symptoms please return to the emergency department for repeat evaluation.

## 2025-01-24 NOTE — ED TRIAGE NOTES
Patient presents to the ED with right lower back pain and abdominal pain that started yesterday. Denies any trauma to the area. No history of kidney stones. Denies any urinary symptoms, fevers, or nausea/vomiting. Patient took ibuprofen today around 4pm.